# Patient Record
Sex: MALE | Race: WHITE | NOT HISPANIC OR LATINO | Employment: UNEMPLOYED | URBAN - METROPOLITAN AREA
[De-identification: names, ages, dates, MRNs, and addresses within clinical notes are randomized per-mention and may not be internally consistent; named-entity substitution may affect disease eponyms.]

---

## 2024-06-14 ENCOUNTER — APPOINTMENT (OUTPATIENT)
Dept: GENERAL RADIOLOGY | Facility: HOSPITAL | Age: 39
End: 2024-06-14
Payer: COMMERCIAL

## 2024-06-14 ENCOUNTER — HOSPITAL ENCOUNTER (EMERGENCY)
Facility: HOSPITAL | Age: 39
Discharge: HOME | End: 2024-06-14
Attending: EMERGENCY MEDICINE
Payer: COMMERCIAL

## 2024-06-14 VITALS
TEMPERATURE: 99 F | DIASTOLIC BLOOD PRESSURE: 68 MMHG | OXYGEN SATURATION: 98 % | RESPIRATION RATE: 16 BRPM | HEIGHT: 70 IN | BODY MASS INDEX: 34.36 KG/M2 | HEART RATE: 78 BPM | SYSTOLIC BLOOD PRESSURE: 106 MMHG | WEIGHT: 240 LBS

## 2024-06-14 DIAGNOSIS — T50.902A INTENTIONAL OVERDOSE, INITIAL ENCOUNTER: Primary | ICD-10-CM

## 2024-06-14 LAB
ALBUMIN SERPL-MCNC: 4.1 G/DL (ref 3.5–5.2)
ALBUMIN/GLOB SERPL: 1.6 G/DL
ALP SERPL-CCNC: 74 U/L (ref 39–117)
ALT SERPL W P-5'-P-CCNC: 42 U/L (ref 1–41)
AMPHET+METHAMPHET UR QL: NEGATIVE
AMPHETAMINES UR QL: NEGATIVE
ANION GAP SERPL CALCULATED.3IONS-SCNC: 10.6 MMOL/L (ref 5–15)
APAP SERPL-MCNC: <5 MCG/ML (ref 0–30)
AST SERPL-CCNC: 45 U/L (ref 1–40)
ATMOSPHERIC PRESS: 739 MMHG
BARBITURATES UR QL SCN: NEGATIVE
BASE EXCESS BLDV CALC-SCNC: 0 MMOL/L (ref 0–2)
BASOPHILS # BLD AUTO: 0.03 10*3/MM3 (ref 0–0.2)
BASOPHILS NFR BLD AUTO: 0.3 % (ref 0–1.5)
BDY SITE: ABNORMAL
BENZODIAZ UR QL SCN: NEGATIVE
BILIRUB SERPL-MCNC: 0.8 MG/DL (ref 0–1.2)
BODY TEMPERATURE: 37
BUN SERPL-MCNC: 11 MG/DL (ref 6–20)
BUN/CREAT SERPL: 14.9 (ref 7–25)
BUPRENORPHINE SERPL-MCNC: POSITIVE NG/ML
CALCIUM SPEC-SCNC: 9.2 MG/DL (ref 8.6–10.5)
CANNABINOIDS SERPL QL: NEGATIVE
CHLORIDE SERPL-SCNC: 105 MMOL/L (ref 98–107)
CO2 SERPL-SCNC: 21.4 MMOL/L (ref 22–29)
COCAINE UR QL: NEGATIVE
CREAT SERPL-MCNC: 0.74 MG/DL (ref 0.76–1.27)
D-LACTATE SERPL-SCNC: 1.4 MMOL/L (ref 0.5–2)
DEPRECATED RDW RBC AUTO: 37.5 FL (ref 37–54)
EGFRCR SERPLBLD CKD-EPI 2021: 118.2 ML/MIN/1.73
EOSINOPHIL # BLD AUTO: 0.06 10*3/MM3 (ref 0–0.4)
EOSINOPHIL NFR BLD AUTO: 0.7 % (ref 0.3–6.2)
ERYTHROCYTE [DISTWIDTH] IN BLOOD BY AUTOMATED COUNT: 12.5 % (ref 12.3–15.4)
ETHANOL BLD-MCNC: <10 MG/DL (ref 0–10)
ETHANOL UR QL: <0.01 %
GLOBULIN UR ELPH-MCNC: 2.6 GM/DL
GLUCOSE BLDC GLUCOMTR-MCNC: 116 MG/DL (ref 70–130)
GLUCOSE BLDC GLUCOMTR-MCNC: 95 MG/DL (ref 70–130)
GLUCOSE SERPL-MCNC: 92 MG/DL (ref 65–99)
HCO3 BLDV-SCNC: 24 MMOL/L (ref 22–28)
HCT VFR BLD AUTO: 39.4 % (ref 37.5–51)
HGB BLD-MCNC: 13.6 G/DL (ref 13–17.7)
HGB BLDA-MCNC: 13.7 G/DL (ref 14–18)
IMM GRANULOCYTES # BLD AUTO: 0.06 10*3/MM3 (ref 0–0.05)
IMM GRANULOCYTES NFR BLD AUTO: 0.7 % (ref 0–0.5)
LYMPHOCYTES # BLD AUTO: 0.63 10*3/MM3 (ref 0.7–3.1)
LYMPHOCYTES NFR BLD AUTO: 6.8 % (ref 19.6–45.3)
Lab: ABNORMAL
MCH RBC QN AUTO: 28.2 PG (ref 26.6–33)
MCHC RBC AUTO-ENTMCNC: 34.5 G/DL (ref 31.5–35.7)
MCV RBC AUTO: 81.7 FL (ref 79–97)
METHADONE UR QL SCN: NEGATIVE
MODALITY: ABNORMAL
MONOCYTES # BLD AUTO: 1.27 10*3/MM3 (ref 0.1–0.9)
MONOCYTES NFR BLD AUTO: 13.8 % (ref 5–12)
NEUTROPHILS NFR BLD AUTO: 7.15 10*3/MM3 (ref 1.7–7)
NEUTROPHILS NFR BLD AUTO: 77.7 % (ref 42.7–76)
NRBC BLD AUTO-RTO: 0 /100 WBC (ref 0–0.2)
OPIATES UR QL: NEGATIVE
OXYCODONE UR QL SCN: NEGATIVE
PCO2 BLDV: 36.5 MM HG (ref 41–51)
PCP UR QL SCN: NEGATIVE
PH BLDV: 7.43 PH UNITS (ref 7.32–7.42)
PLATELET # BLD AUTO: 108 10*3/MM3 (ref 140–450)
PMV BLD AUTO: 10.3 FL (ref 6–12)
PO2 BLDV: 41.7 MM HG (ref 27–53)
POTASSIUM SERPL-SCNC: 3.6 MMOL/L (ref 3.5–5.2)
PROT SERPL-MCNC: 6.7 G/DL (ref 6–8.5)
QT INTERVAL: 344 MS
QTC INTERVAL: 422 MS
RBC # BLD AUTO: 4.82 10*6/MM3 (ref 4.14–5.8)
SALICYLATES SERPL-MCNC: <0.5 MG/DL
SAO2 % BLDCOV: 83.3 % (ref 45–75)
SODIUM SERPL-SCNC: 137 MMOL/L (ref 136–145)
TRICYCLICS UR QL SCN: NEGATIVE
WBC NRBC COR # BLD AUTO: 9.2 10*3/MM3 (ref 3.4–10.8)

## 2024-06-14 PROCEDURE — 99284 EMERGENCY DEPT VISIT MOD MDM: CPT

## 2024-06-14 PROCEDURE — 80179 DRUG ASSAY SALICYLATE: CPT | Performed by: EMERGENCY MEDICINE

## 2024-06-14 PROCEDURE — 83605 ASSAY OF LACTIC ACID: CPT | Performed by: EMERGENCY MEDICINE

## 2024-06-14 PROCEDURE — 74018 RADEX ABDOMEN 1 VIEW: CPT

## 2024-06-14 PROCEDURE — 96374 THER/PROPH/DIAG INJ IV PUSH: CPT

## 2024-06-14 PROCEDURE — 93010 ELECTROCARDIOGRAM REPORT: CPT | Performed by: INTERNAL MEDICINE

## 2024-06-14 PROCEDURE — 25010000002 ONDANSETRON PER 1 MG: Performed by: EMERGENCY MEDICINE

## 2024-06-14 PROCEDURE — 82948 REAGENT STRIP/BLOOD GLUCOSE: CPT

## 2024-06-14 PROCEDURE — 80143 DRUG ASSAY ACETAMINOPHEN: CPT | Performed by: EMERGENCY MEDICINE

## 2024-06-14 PROCEDURE — 80306 DRUG TEST PRSMV INSTRMNT: CPT | Performed by: EMERGENCY MEDICINE

## 2024-06-14 PROCEDURE — 82803 BLOOD GASES ANY COMBINATION: CPT

## 2024-06-14 PROCEDURE — 85025 COMPLETE CBC W/AUTO DIFF WBC: CPT | Performed by: EMERGENCY MEDICINE

## 2024-06-14 PROCEDURE — 93005 ELECTROCARDIOGRAM TRACING: CPT | Performed by: EMERGENCY MEDICINE

## 2024-06-14 PROCEDURE — 25810000003 SODIUM CHLORIDE 0.9 % SOLUTION: Performed by: EMERGENCY MEDICINE

## 2024-06-14 PROCEDURE — 82077 ASSAY SPEC XCP UR&BREATH IA: CPT | Performed by: EMERGENCY MEDICINE

## 2024-06-14 PROCEDURE — 80053 COMPREHEN METABOLIC PANEL: CPT | Performed by: EMERGENCY MEDICINE

## 2024-06-14 RX ORDER — ONDANSETRON 2 MG/ML
8 INJECTION INTRAMUSCULAR; INTRAVENOUS ONCE
Status: COMPLETED | OUTPATIENT
Start: 2024-06-14 | End: 2024-06-14

## 2024-06-14 RX ORDER — ROSUVASTATIN CALCIUM 10 MG/1
10 TABLET, COATED ORAL DAILY
COMMUNITY

## 2024-06-14 RX ORDER — SENNOSIDES A AND B 8.6 MG/1
2 TABLET, FILM COATED ORAL DAILY
COMMUNITY

## 2024-06-14 RX ORDER — SODIUM CHLORIDE 0.9 % (FLUSH) 0.9 %
10 SYRINGE (ML) INJECTION AS NEEDED
Status: DISCONTINUED | OUTPATIENT
Start: 2024-06-14 | End: 2024-06-15 | Stop reason: HOSPADM

## 2024-06-14 RX ORDER — CAPSAICIN 0.75 MG/G
1 CREAM TOPICAL 2 TIMES DAILY
COMMUNITY

## 2024-06-14 RX ADMIN — SODIUM CHLORIDE 1000 ML: 9 INJECTION, SOLUTION INTRAVENOUS at 16:49

## 2024-06-14 RX ADMIN — POISON TREATMENT ADSORBENT 110 G: 50 SUSPENSION ORAL at 17:15

## 2024-06-14 RX ADMIN — ONDANSETRON 8 MG: 2 INJECTION INTRAMUSCULAR; INTRAVENOUS at 18:06

## 2024-06-14 NOTE — ED NOTES
When I asked the patient why he wanted to kill himself he replied that he got some bad news from home today and he was just done. Pt stated that he still wanted to kill himself.

## 2024-06-14 NOTE — ED PROVIDER NOTES
"Subjective     History provided by:  Patient and EMS personnel    History of Present Illness    Chief complaint: Overdose    Location: At the [facility]    Quality/Severity: The patient states he took 25 metformin tablets, he had about 25 other pills.  He is not sure exactly what pills they were, but states they were his medications.    Timing/Onset: He started taking some of the pills at noon today, and is taking other pills since.    Modifying Factors: Patient states he does not want to live anymore.  He is incarcerated at [facility].    Associated symptoms: He states his head is spinning a little bit and he feels weak.  He has dry mouth and he is having some right-sided stomach cramping.    Narrative: The patient is a 39-year-old white male who is a inmate at [facility].  He states he no longer wants to live, and attempted to kill himself by overdosing.  He states he took 25 of his metformin's, and 25 lisinopril's.  The patient has not prescribed lisinopril.  The patient states the medications were his own medicines.  He does have a history of type 2 diabetes and hypertension.    Review of Systems  Past Medical History:   Diagnosis Date    Cellulitis of left upper limb     Chronic viral hepatitis C     Diabetes mellitus     Empyema     Gallbladder calculus     Hypertension     Hypertriglyceridemia     Rosacea      /68   Pulse 78   Temp 99 °F (37.2 °C) (Oral)   Resp 16   Ht 177.8 cm (70\")   Wt 109 kg (240 lb)   SpO2 98%   BMI 34.44 kg/m²     Past Medical History:   Diagnosis Date    Cellulitis of left upper limb     Chronic viral hepatitis C     Diabetes mellitus     Empyema     Gallbladder calculus     Hypertension     Hypertriglyceridemia     Rosacea        No Known Allergies    Past Surgical History:   Procedure Laterality Date    MASS EXCISION      lung       History reviewed. No pertinent family history.    Social History     Socioeconomic History "    Marital status: Single   Tobacco Use    Smoking status: Former     Types: Cigarettes   Substance and Sexual Activity    Alcohol use: Not Currently    Drug use: Not Currently     Comment: former meth, heroin    Sexual activity: Defer           Objective   Physical Exam  Vitals and nursing note reviewed.   Constitutional:       General: He is not in acute distress.     Appearance: Normal appearance. He is well-developed and normal weight. He is not ill-appearing, toxic-appearing or diaphoretic.      Comments: Patient appears healthy in no acute distress.  Review of his vital signs: Heart rate slightly elevated at 98, remainder vital signs within normal limits.   HENT:      Head: Normocephalic and atraumatic.      Nose: Nose normal.      Mouth/Throat:      Mouth: Mucous membranes are moist.      Pharynx: Oropharynx is clear.   Eyes:      General: No scleral icterus.        Right eye: No discharge.         Left eye: No discharge.      Conjunctiva/sclera: Conjunctivae normal.      Pupils: Pupils are equal, round, and reactive to light.   Neck:      Thyroid: No thyromegaly.      Vascular: No JVD.   Cardiovascular:      Rate and Rhythm: Normal rate and regular rhythm.      Heart sounds: Normal heart sounds. No murmur heard.  Pulmonary:      Effort: Pulmonary effort is normal.      Breath sounds: Normal breath sounds. No wheezing, rhonchi or rales.   Chest:      Chest wall: No tenderness.   Abdominal:      General: Bowel sounds are normal. There is no distension.      Palpations: Abdomen is soft.      Tenderness: There is no abdominal tenderness.   Musculoskeletal:         General: No tenderness or deformity. Normal range of motion.      Cervical back: Normal range of motion and neck supple. No tenderness.   Lymphadenopathy:      Cervical: No cervical adenopathy.   Skin:     General: Skin is warm and dry.      Capillary Refill: Capillary refill takes less than 2 seconds.      Coloration: Skin is not pale.      Findings:  No erythema or rash.   Neurological:      General: No focal deficit present.      Mental Status: He is alert and oriented to person, place, and time.      Cranial Nerves: No cranial nerve deficit.      Coordination: Coordination normal.      Comments: No focal motor sensory deficit   Psychiatric:         Mood and Affect: Mood normal.         Behavior: Behavior normal.         Thought Content: Thought content normal.         Judgment: Judgment normal.         Procedures           ED Course  ED Course as of 06/14/24 2240   Fri Jun 14, 2024 1708 17:07   I was just informed by the  that the patient also told them that he swallowed razor blades.  I will obtain a KUB to further evaluate. [TP]   1726 My interpretation of the patient's EKG tracing performed at 17: 23 is normal sinus rhythm with a rate of 90, normal axis, normal conduction, no acute ST segment elevation or depression consistent with ischemia, no ectopy, normal R wave transition, normal TX and QT intervals.  Normal EKG. [TP]   1727 The patient's fingerstick glucose was normal at 95 on arrival. [TP]   1729 The patient has changed his story on the number of pills and the color of the pills and stating he does not know exactly what pills he took when he talks to the nurses and me. [TP]   1732 Activated charcoal 1 g/kg (110 g) with sorbitol was ordered to be administered p.o. [TP]   1732 Poison control was contacted, who recommended a venous gas and an EKG.  They recommended the patient be observed for 6 hours.  They informed the nurse that we should not force the activated charcoal with a NG should the patient refused to drink it. [TP]   2020 Review of the patient's laboratory studies: The patient's venous gas has a normal pH of 7.427.  CMP has normal electrolytes and normal renal function test.  Slight elevation of the transaminases with ALT of 72 and AST of 45.  Acetaminophen and salicylate levels were 0.  Lactic acid was normal at 1.0.   Ethanol level 0.  CBC had a normal white count of 9.2 with a slight left shift.  Hemoglobin, hematocrit and platelets within normal limits.  Urine tox screen positive only for buprenorphine. [TP]   2022 19:25 repeat fingerstick glucose was 116. [TP]   2022 The patient was administered a liter normal saline IV bolus. [TP]   2048 KUB of the abdomen also showed the chest cavity well.  There are no foreign bodies in the chest or abdomen. [TP]   2222 22: 22 the patient was observed for 6 hours.  He developed no hypotension.  He developed no hypoglycemia.  He has no evidence of metallic foreign body ingestion.  He does not have symptoms or signs consistent with an overdose of metformin.  He has given multiple different stories as to what he ingested.  I am not totally convinced that he actually overdosed.  He is stable for discharge. [TP]      ED Course User Index  [TP] Dexter Smalls MD                                             Medical Decision Making  Problems Addressed:  Intentional overdose, initial encounter: complicated acute illness or injury    Amount and/or Complexity of Data Reviewed  Labs: ordered. Decision-making details documented in ED Course.  Radiology: ordered. Decision-making details documented in ED Course.  ECG/medicine tests: ordered and independent interpretation performed. Decision-making details documented in ED Course.    Risk  OTC drugs.  Prescription drug management.        Final diagnoses:   Intentional overdose, initial encounter       ED Disposition  ED Disposition       ED Disposition   Discharge    Condition   Stable    Comment   --               custodial psychiatric procedure/clinic.    Go in 1 day           Medication List      No changes were made to your prescriptions during this visit.           Labs Reviewed   COMPREHENSIVE METABOLIC PANEL - Abnormal; Notable for the following components:       Result Value    Creatinine 0.74 (*)     CO2 21.4 (*)     ALT (SGPT) 42 (*)     AST (SGOT)  45 (*)     All other components within normal limits    Narrative:     GFR Normal >60  Chronic Kidney Disease <60  Kidney Failure <15     URINE DRUG SCREEN - Abnormal; Notable for the following components:    Buprenorphine, Screen, Urine Positive (*)     All other components within normal limits    Narrative:     Cutoff For Drugs Screened:    Amphetamines               500 ng/ml  Barbiturates               200 ng/ml  Benzodiazepines            150 ng/ml  Cocaine                    150 ng/ml  Methadone                  200 ng/ml  Opiates                    100 ng/ml  Phencyclidine               25 ng/ml  THC                         50 ng/ml  Methamphetamine            500 ng/ml  Tricyclic Antidepressants  300 ng/ml  Oxycodone                  100 ng/ml  Buprenorphine               10 ng/ml    The normal value for all drugs tested is negative. This report includes unconfirmed screening results, with the cutoff values listed, to be used for medical treatment purposes only.  Unconfirmed results must not be used for non-medical purposes such as employment or legal testing.  Clinical consideration should be applied to any drug of abuse test, particularly when unconfirmed results are used.     CBC WITH AUTO DIFFERENTIAL - Abnormal; Notable for the following components:    Platelets 108 (*)     Neutrophil % 77.7 (*)     Lymphocyte % 6.8 (*)     Monocyte % 13.8 (*)     Immature Grans % 0.7 (*)     Neutrophils, Absolute 7.15 (*)     Lymphocytes, Absolute 0.63 (*)     Monocytes, Absolute 1.27 (*)     Immature Grans, Absolute 0.06 (*)     All other components within normal limits   BLOOD GAS, VENOUS - Abnormal; Notable for the following components:    pH, Venous 7.427 (*)     pCO2, Venous 36.5 (*)     O2 Saturation, Venous 83.3 (*)     Hemoglobin, Blood Gas 13.7 (*)     All other components within normal limits   LACTIC ACID, PLASMA - Normal   ACETAMINOPHEN LEVEL - Normal   SALICYLATE LEVEL - Normal   POCT GLUCOSE FINGERSTICK -  Normal   POCT GLUCOSE FINGERSTICK - Normal   ETHANOL   BLOOD GAS, VENOUS   CBC AND DIFFERENTIAL    Narrative:     The following orders were created for panel order CBC & Differential.  Procedure                               Abnormality         Status                     ---------                               -----------         ------                     CBC Auto Differential[[id number]]        Abnormal            Final result               Scan Slide[[id number]]                                                                    Please view results for these tests on the individual orders.     XR Abdomen KUB   Final Result   Impression:   No metallic foreign bodies in the chest or abdomen.         Electronically Signed: Caleb Gomez MD     6/14/2024 5:55 PM EDT     Workstation ID: [id number]             Medication List      No changes were made to your prescriptions during this visit.              Dexter Smalls MD  06/14/24 [id number]

## 2024-06-14 NOTE — ED NOTES
"I inquired what medications that the patient took. The pt told the triage nurse that he took Metformin and Lisinopril. The patient told me that he took \" some BP med about 1 hour ago ( 15:15 ). Pt states he didn't count them but about 25 pills. Pt also took 5-6 blue pills at 12:00. Later when pressing the patient about what the names of the pills were we then said he took some pink pills and some white pills. Pt also c/o feeling weak and his feeling like his \" head is spinning \".    "

## 2024-06-14 NOTE — ED NOTES
I called Poison Control and spoke with Ann. She stated to monitor the patient for 6 hours and informed me of symptoms to watch for and additional tests that should be ordered. Dr. Smalls informed of same. Ann also stated if the patient didn't drink all the  charcoal  to not place an NGT to give Charcoal through as the risk of aspiration is too high. Dr. Smalls informed of this also.